# Patient Record
Sex: MALE | Race: WHITE
[De-identification: names, ages, dates, MRNs, and addresses within clinical notes are randomized per-mention and may not be internally consistent; named-entity substitution may affect disease eponyms.]

---

## 2018-08-12 ENCOUNTER — HOSPITAL ENCOUNTER (OUTPATIENT)
Dept: HOSPITAL 97 - ER | Age: 48
Setting detail: OBSERVATION
LOS: 2 days | Discharge: HOME | End: 2018-08-14
Attending: FAMILY MEDICINE | Admitting: FAMILY MEDICINE
Payer: COMMERCIAL

## 2018-08-12 VITALS — BODY MASS INDEX: 36.5 KG/M2

## 2018-08-12 DIAGNOSIS — E11.9: ICD-10-CM

## 2018-08-12 DIAGNOSIS — E78.5: ICD-10-CM

## 2018-08-12 DIAGNOSIS — F17.210: ICD-10-CM

## 2018-08-12 DIAGNOSIS — M62.82: Primary | ICD-10-CM

## 2018-08-12 DIAGNOSIS — R55: ICD-10-CM

## 2018-08-12 DIAGNOSIS — E86.0: ICD-10-CM

## 2018-08-12 DIAGNOSIS — N17.9: ICD-10-CM

## 2018-08-12 DIAGNOSIS — I10: ICD-10-CM

## 2018-08-12 LAB
ALBUMIN SERPL BCP-MCNC: 4.3 G/DL (ref 3.4–5)
ALP SERPL-CCNC: 109 U/L (ref 45–117)
ALT SERPL W P-5'-P-CCNC: 50 U/L (ref 12–78)
AST SERPL W P-5'-P-CCNC: 39 U/L (ref 15–37)
BUN BLD-MCNC: 77 MG/DL (ref 7–18)
GLUCOSE SERPLBLD-MCNC: 91 MG/DL (ref 74–106)
HCT VFR BLD CALC: 44.5 % (ref 39.6–49)
LYMPHOCYTES # SPEC AUTO: 2.3 K/UL (ref 0.7–4.9)
MCH RBC QN AUTO: 28.5 PG (ref 27–35)
MCV RBC: 83.2 FL (ref 80–100)
PMV BLD: 8.4 FL (ref 7.6–11.3)
POTASSIUM SERPL-SCNC: 3.5 MMOL/L (ref 3.5–5.1)
RBC # BLD: 5.35 M/UL (ref 4.33–5.43)

## 2018-08-12 PROCEDURE — 96361 HYDRATE IV INFUSION ADD-ON: CPT

## 2018-08-12 PROCEDURE — 70450 CT HEAD/BRAIN W/O DYE: CPT

## 2018-08-12 PROCEDURE — 80048 BASIC METABOLIC PNL TOTAL CA: CPT

## 2018-08-12 PROCEDURE — 81003 URINALYSIS AUTO W/O SCOPE: CPT

## 2018-08-12 PROCEDURE — 84439 ASSAY OF FREE THYROXINE: CPT

## 2018-08-12 PROCEDURE — 80061 LIPID PANEL: CPT

## 2018-08-12 PROCEDURE — 99285 EMERGENCY DEPT VISIT HI MDM: CPT

## 2018-08-12 PROCEDURE — 36415 COLL VENOUS BLD VENIPUNCTURE: CPT

## 2018-08-12 PROCEDURE — 93005 ELECTROCARDIOGRAM TRACING: CPT

## 2018-08-12 PROCEDURE — 84443 ASSAY THYROID STIM HORMONE: CPT

## 2018-08-12 PROCEDURE — 93306 TTE W/DOPPLER COMPLETE: CPT

## 2018-08-12 PROCEDURE — 85025 COMPLETE CBC W/AUTO DIFF WBC: CPT

## 2018-08-12 PROCEDURE — 80053 COMPREHEN METABOLIC PANEL: CPT

## 2018-08-12 PROCEDURE — 93880 EXTRACRANIAL BILAT STUDY: CPT

## 2018-08-12 PROCEDURE — 83735 ASSAY OF MAGNESIUM: CPT

## 2018-08-12 PROCEDURE — 96360 HYDRATION IV INFUSION INIT: CPT

## 2018-08-12 PROCEDURE — 76770 US EXAM ABDO BACK WALL COMP: CPT

## 2018-08-12 PROCEDURE — 82550 ASSAY OF CK (CPK): CPT

## 2018-08-12 PROCEDURE — 80076 HEPATIC FUNCTION PANEL: CPT

## 2018-08-12 PROCEDURE — 82962 GLUCOSE BLOOD TEST: CPT

## 2018-08-12 RX ADMIN — Medication SCH ML: at 21:47

## 2018-08-12 RX ADMIN — SODIUM CHLORIDE SCH MLS: 0.9 INJECTION, SOLUTION INTRAVENOUS at 21:45

## 2018-08-12 RX ADMIN — METOPROLOL TARTRATE SCH MG: 25 TABLET ORAL at 21:42

## 2018-08-12 RX ADMIN — ENOXAPARIN SODIUM SCH MG: 30 INJECTION SUBCUTANEOUS at 21:45

## 2018-08-12 RX ADMIN — PANTOPRAZOLE SODIUM SCH MG: 40 TABLET, DELAYED RELEASE ORAL at 21:41

## 2018-08-12 RX ADMIN — HUMAN INSULIN SCH: 100 INJECTION, SOLUTION SUBCUTANEOUS at 21:00

## 2018-08-12 NOTE — RAD REPORT
EXAM DESCRIPTION:  CT - Head Brain Wo Cont - 8/12/2018 5:38 pm

 

CLINICAL HISTORY:  Syncope

 

COMPARISON:  None.

 

TECHNIQUE:  Axial 5 mm thick images of the head were obtained without IV contrast.

 

All CT scans are performed using dose optimization technique as appropriate and may include automated
 exposure control or mA/KV adjustment according to patient size.

 

FINDINGS:  No intracranial hemorrhage, mass, edema or shift of mid-line structures. No acute infarcti
on changes seen. No abnormal extra-axial fluid collections. Ventricles are normal.

 

Mastoid air cells and visualized portions of the paranasal sinuses are clear.

 

No acute bony findings.

 

 

IMPRESSION:  Negative non-contrast CT head examination.

## 2018-08-12 NOTE — XMS REPORT
[facility] - Continuity of Care Document

 Created on:2018



Patient:IAIN DOWNING

Sex:Male

:1970

External Reference #:[id number]





Demographics







 Address  [address] [place] [id number]

 

 Home Phone  [phone]

 

 Preferred Language  English

 

 Marital Status  

 

 Latter day Affiliation  Unknown

 

 Race  White

 

 Ethnic Group  Not  or 









Author







 Organization  [facility]









Care Team Providers







 Name  Role  Phone

 

 DR CHARISSA TIAN  Admitting Physician  [phone]

 

 DR CHARISSA TIAN  Attending Physician  [phone]









Hospital Admission Diagnosis







 Code  Admission Diagnosis  Date

 

   PREDIABETES  







Social History







 Element  Code  Description  Smoking  Start Date  End Date



 Description      Status Code    



       System    



           

 

 Smoking Status  [id number]  Current every day  SNOMED-CT    



     smoker      







Problems

No data in the system



Medications







 SNOMED CT  Description

 

 [id number]  Drug Treatment Unknown



   







Allergies

No Allergy Data in the System



Results

No data in the system



Vital Signs

No data in the system



Plan of Care

No data in the system



Procedures

No data in the system



Encounters







 Date  Code  Diagnosis  Status

 

   (ICD10) -   PREDIABETES  Active







Immunizations

No data in the system



Functional Status

No data in the system



Hospital Discharge Instructions

No data in the system

## 2018-08-12 NOTE — RAD REPORT
EXAM DESCRIPTION:  US - Renal Ultrasound-Complete - 8/12/2018 9:03 pm

 

CLINICAL HISTORY:  Acute renal failure

 

COMPARISON:  None.

 

FINDINGS:  The right kidney measures 11.9 x 6.1 x 6.1 cm.  The left kidney measures 11.1 x 5.0 x 5.7 
cm. Renal cortical thickness and echogenicity are normal. No hydronephrosis. In the upper pole right 
kidney a 2.6 centimeter oval hypoechoic mass is present. Detail is somewhat limited by body habitus. 
Benign cyst is the most likely etiology.

 

No bladder wall thickening or mass. No intraluminal stone or mass.

 

 

IMPRESSION:  No hydronephrosis or suspicious renal mass.

 

A 2.6 cm oval hypoechoic mass upper pole right kidney is most likely a simple cyst.

## 2018-08-12 NOTE — RAD REPORT
EXAM DESCRIPTION:  ROBERTO Nguyen CP - 8/12/2018 9:03 pm

 

CLINICAL HISTORY:  Syncope

 

COMPARISON:  None.

 

TECHNIQUE:  Real-time sonographic evaluation of both carotid systems was performed. Grayscale and Dop
pler interrogation was performed with waveform tracing bilaterally.

 

FINDINGS:  Normal high resistance waveforms are noted in both external carotid arteries. The common c
arotid arteries and internal carotid arteries show normal low resistance waveforms.

 

Mild bulb plaquing changes are present. No visual evidence for significant luminal narrowing. Peak sy
stolic and end diastolic velocity values and the ICA/CCA ratios are in the non-hemodynamically signif
icant range.

 

Antegrade flow seen in both vertebral arteries.

 

Velocity values and ratios were recorded and are retained in the patient's imaging records.

 

 

IMPRESSION:  No significant atherosclerotic changes noted.

 

No evidence of a hemodynamically significant stenosis.

## 2018-08-12 NOTE — EDPHYS
Physician Documentation                                                                           

 [facility]                                                                

Name: Colten De La Vega                                                                               

Age: 47 yrs                                                                                       

Sex: Male                                                                                         

: 1970                                                                                   

MRN: [id number]                                                                                   

Arrival Date: 2018                                                                          

Time: 15:54                                                                                       

Account#: [id number]                                                                            

Bed 13                                                                                            

Private MD: Out, [facility]                                                                    

ED Physician Sudeep Douglas                                                                         

HPI:                                                                                              

                                                                                             

17:00 This 47 yrs old  Male presents to ER via Ambulatory with complaints of         pm1 

      Dizziness, Near Syncope.                                                                    

17:00 The patient presents with feeling faint. Onset: The symptoms/episode began/occurred 2   pm1 

      day(s) ago. Context: occurred at work, occurred while the patient was working, just         

      prior to the episode the patient experienced no apparent symptoms. Modifying factors:       

      The symptoms are alleviated by rest.                                                        

17:00 Associated signs and symptoms: Pertinent negatives: abdominal pain, chest pain, nausea, pm1 

      palpitations, shortness of breath, vomiting. Severity of symptoms: in the emergency         

      department the symptoms have resolved. The patient has not experienced similar symptoms     

      in the past. The patient has been recently seen by a physician: the patient's primary       

      care provider, 3 weeks ago for wellness check. Had labs done and all within normal          

      limits. 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 Patient        

      believes that he almost fainted because all he recalls is him sitting on the ground         

      with his coworkers holding him up from behind and another one handing him a bottled         

      water. Patient did not have any chest pain, shortness of breath, or any dizziness prior     

      to syncopal or near syncopal event. Patient was sent home and on Saturday and ,       

      the patient drank a total of two cases of bottled water because he felt that he might       

      have been dehydrated. Patient attempted to work again today but he did not have the         

      energy to pull the cables. He was sent to the ER by his employer for evaluation.            

17:00 Patient has no current complaints except for generalized muscle aches.                  pm1 

                                                                                                  

Historical:                                                                                       

- Allergies:                                                                                      

16:23 Motrin (Rash);                                                                          aj1 

16:23 Naproxen (Anaphylaxis);                                                                 aj1 

- Home Meds:                                                                                      

16:23 Lisinopril Oral [Active]; Metformin Oral [Active]; atorvastatin 10 mg oral tab          aj1 

      [Active]; Metoprolol Tartrate Oral [Active]; HCTZ [Active];                                 

- PMHx:                                                                                           

16:23 Hypertension; pre-diabetic;                                                             aj1 

                                                                                                  

- Immunization history:: Flu vaccine is up to date.                                               

- Social history:: Smoking status: Patient uses tobacco products, smokes one pack                 

  cigarettes per day.                                                                             

- Ebola Screening: : Patient denies travel to an Ebola-affected area in the 21 days               

  before illness onset.                                                                           

                                                                                                  

                                                                                                  

ROS:                                                                                              

17:00 Constitutional: Negative for fever, chills, and weight loss, Eyes: Negative for injury, pm1 

      pain, redness, and discharge, ENT: Negative for injury, pain, and discharge, Neck:          

      Negative for injury, pain, and swelling, Cardiovascular: Negative for chest pain,           

      palpitations, and edema, Respiratory: Negative for shortness of breath, cough,              

      wheezing, and pleuritic chest pain, Abdomen/GI: Negative for abdominal pain, nausea,        

      vomiting, diarrhea, and constipation, Back: Negative for injury and pain, : Negative      

      for injury, bleeding, discharge, and swelling, MS/Extremity: Negative for injury and        

      deformity, Skin: Negative for injury, rash, and discoloration, Neuro: Negative for          

      headache, weakness, numbness, tingling, and seizure.                                        

                                                                                                  

Exam:                                                                                             

17:00 Constitutional:  This is a well developed, well nourished patient who is awake, alert,  pm1 

      and in no acute distress. Head/Face:  Normocephalic, atraumatic. Eyes:  Pupils equal        

      round and reactive to light, extra-ocular motions intact.  Lids and lashes normal.          

      Conjunctiva and sclera are non-icteric and not injected.  Cornea within normal limits.      

      Periorbital areas with no swelling, redness, or edema. ENT:  Nares patent. No nasal         

      discharge, no septal abnormalities noted.  Tympanic membranes are normal and external       

      auditory canals are clear.  Oropharynx with no redness, swelling, or masses, exudates,      

      or evidence of obstruction, uvula midline.  Mucous membranes moist. Neck:  Trachea          

      midline, no thyromegaly or masses palpated, and no cervical lymphadenopathy.  Supple,       

      full range of motion without nuchal rigidity, or vertebral point tenderness.  No            

      Meningismus. Chest/axilla:  Normal chest wall appearance and motion.  Nontender with no     

      deformity.  No lesions are appreciated. Cardiovascular:  Regular rate and rhythm with a     

      normal S1 and S2.  No gallops, murmurs, or rubs.  Normal PMI, no JVD.  No pulse             

      deficits. Respiratory:  Lungs have equal breath sounds bilaterally, clear to                

      auscultation and percussion.  No rales, rhonchi or wheezes noted.  No increased work of     

      breathing, no retractions or nasal flaring. Abdomen/GI:  Soft, non-tender, with normal      

      bowel sounds.  No distension or tympany.  No guarding or rebound.  No evidence of           

      tenderness throughout. Back:  No spinal tenderness.  No costovertebral tenderness.          

      Full range of motion. Skin:  Warm, dry with normal turgor.  Normal color with no            

      rashes, no lesions, and no evidence of cellulitis. MS/ Extremity:  Pulses equal, no         

      cyanosis.  Neurovascular intact.  Full, normal range of motion.                             

17:00 Neuro: Orientation: is normal, Cranial nerves: CN II- XII are normal as tested,             

      Cerebellar function: normal finger to nose testing, Motor: is normal, moves all fours.      

                                                                                                  

Vital Signs:                                                                                      

16:23  / 64; Pulse 61; Resp 18; Temp 97.4(TE); Pulse Ox 97% on R/A; Weight 128.82 kg    aj1 

      (R); Height 6 ft. 0 in. (182.88 cm) (R); Pain 0/10;                                         

17:16  / 64; Pulse 54; Resp 19; Pulse Ox 99% on R/A;                                    rb1 

18:15  / 58; Pulse 52; Resp 17; Pulse Ox 100% on R/A;                                   rb1 

19:11  / 72; Pulse 60; Resp 18; Pulse Ox 100% on R/A; Pain 0/10;                        lp1 

16:23 Body Mass Index 38.52 (128.82 kg, 182.88 cm)                                            [facility] 

                                                                                                  

MDM:                                                                                              

16:34 Patient medically screened.                                                             pm1 

18:12 Data reviewed: vital signs. Data interpreted: Pulse oximetry: on room air is 99 %.      pm1 

      Interpretation: normal. Counseling: I had a detailed discussion with the patient and/or     

      guardian regarding: the historical points, exam findings, and any diagnostic results        

      supporting the discharge/admit diagnosis, lab results, radiology results, the need for      

      further work-up and treatment in the hospital.                                              

18:12 Physician consultation: Jimi Wisdom DO was contacted at 18:12, regarding admission,   pm1 

      and will see patient in ED.                                                                 

                                                                                                  

                                                                                             

16:57 Order name: Basic Metabolic Panel                                                       pm1 

                                                                                             

16:57 Order name: CBC with Diff                                                               pm1 

                                                                                             

16:57 Order name: CPK; Complete Time: 17:50                                                   pm1 

                                                                                             

16:57 Order name: LFT's; Complete Time: 17:50                                                 pm1 

                                                                                             

16:58 Order name: Basic Metabolic Panel; Complete Time: 17:50                                 EDMS

                                                                                             

16:58 Order name: CBC with Automated Diff; Complete Time: 17:50                               EDMS

                                                                                             

16:57 Order name: EKG; Complete Time: 16:58                                                   pm1 

                                                                                             

16:57 Order name: EKG - Nurse/Tech; Complete Time: 18:57                                      pm1 

                                                                                             

16:57 Order name: IV Saline Lock; Complete Time: 17:46                                        pm1 

                                                                                             

16:58 Order name: CT Head Brain wo Cont; Complete Time: 17:50                                 pm1 

                                                                                             

19:17 Order name: Urine Dipstick--Ancillary (enter results)                                     

                                                                                             

19:21 Order name: Urine Dipstick-Ancillary; Complete Time: 20:25                              EDMS

                                                                                             

16:57 Order name: Labs collected and sent; Complete Time: 17:46                               pm1 

                                                                                             

16:57 Order name: Urine Dipstick-Ancillary (obtain specimen); Complete Time: 19:12            pm1 

                                                                                                  

Administered Medications:                                                                         

17:35 Drug: NS 0.9% 1000 ml Route: IV; Rate: 1000 ml; Site: right antecubital;                rb1 

18:40 Follow up: IV Status: Completed infusion                                                rb1 

18:35 Drug: NS 0.9% 1000 ml Route: IV; Rate: 1000 ml; Site: right antecubital;                rb1 

20:19 Follow up: IV Status: Completed infusion; IV Intake: 1000ml                             lp1 

                                                                                                  

                                                                                                  

Disposition:                                                                                      

18 18:13 Hospitalization ordered by Jimi Wisdom for Observation. Preliminary             

  diagnosis are Acute kidney injury, Dehydration, Rhabdomyolysis.                                 

- Bed requested for Telemetry/MedSurg (observation).                                              

- Status is Observation.                                                                      lp1 

- Condition is Stable.                                                                            

- Problem is new.                                                                                 

- Symptoms have improved.                                                                         

UTI on Admission? No                                                                              

                                                                                                  

                                                                                                  

                                                                                                  

Addendum:                                                                                         

2018                                                                                        

     08:26 Co-signature as Attending Physician, Sudeep Douglas MD.                                    r
n

                                                                                                  

Signatures:                                                                                       

Dispatcher [employer]                           Pilar Funes RN                     RN   [email or web address]                                                  

Komal Francisco RN                     RN   kl                                                   

Sudeep Douglas MD MD rn Pena, Laura, RN RN   lp1                                                  

Lacey Morales, RN                     RN   rb1                                                  

Shadi Quintero, BIANCA                    NP   pm1                                                  

                                                                                                  

Corrections: (The following items were deleted from the chart)                                    

                                                                                             

19:49 18:13 Hospitalization Ordered by Jimi Wisdom DO for Observation. Preliminary          kl  

      diagnosis is Acute kidney injury; Dehydration; Rhabdomyolysis. Bed requested for            

      Telemetry/MedSurg (observation). Status is Observation. Condition is Stable. Problem is     

      new. Symptoms have improved. UTI on Admission? No. pm1                                      

21:02 19:49 2018 18:13 Hospitalization Ordered by Jimi Wisdom DO for Observation.     lp1 

      Preliminary diagnosis is Acute kidney injury; Dehydration; Rhabdomyolysis. Bed              

      requested for Telemetry/MedSurg (observation). Status is Observation. Condition is          

      Stable. Problem is new. Symptoms have improved. UTI on Admission? No. kl                    

                                                                                                  

**************************************************************************************************

## 2018-08-12 NOTE — P.HP
Certification for Inpatient


Patient admitted to: Observation


With expected LOS: <2 Midnights


Patient will require the following post-hospital care: None


Practitioner: I am a practitioner with admitting privileges, knowledge of 

patient current condition, hospital course, and medical plan of care.


Services: Services provided to patient in accordance with Admission 

requirements found in Title 42 Section 412.3 of the Code of Federal Regulations





Patient History


Date of Service: 08/12/18


Primary Care Provider: ULICES Maza


Reason for admission: Fatigue, presyncope


History of Present Illness: 





47-year-old  male presented emergency room with fatigue and presyncope.





Patient reports a history of diabetes, hypertension, hyperlipidemia and tobacco 

abuse.  Patient takes multiple medications including metformin, lisinopril, 

hydrochlorothiazide, Lipitor and metoprolol.  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 

originally from [place], [place].  He reports fatigue.  On Friday he was lifting 

heavy equipment on the boat that he works on.  He had been working very hard.  

He felt like he was going to faint.  He apparently sat down.  He does not 

recall the couple of seconds while sitting down.  His coworkers were asking if 

he was okay.  He recalls telling his coworkers that he was okay.  He did not 

fall or hurt himself.  His coworkers ask if he wanted to get off the boat to 

get evaluated.  He did not want to go.  He decided to stay.  He decided to 

rest.  He rested for about 6 hr.  He drank plan of fluids.  The following day 

he worked.  He did feel little bit tired.  He reports hydrating with Gatorade.  

Today he was at work felt more lightheaded and fatigue.  Some muscle cramping 

was noted. He was doing a job that usually takes him about 10 min but it was 

taking him more time and required more help.  That is when he was told to stop 

and go to the emergency room for evaluation.





In the ER he was evaluated.  Initial blood pressure was slightly low with a 

systolic around 100-110.  Bicarb 19, BUN of 77, creatinine 3.5 with a GFR of 

19. Glucose 91.  AST 39.  CPK was elevated at 701. CT head was unremarkable.  

Patient was moving appropriately.  Due to the nature the findings the patient 

was admitted for observation.  He was given IV fluids in the emergency room.





When I saw the patient ER, appeared stable.  He went to go home.  He understand 

the risks but eventually decided to stay.  He reports that he last saw his PCP 

about 3 weeks ago.  He had normal lab at that time.  He recalls his A1c being 

about 6.4.





- Past Medical/Surgical History


Diabetic: Yes


-: Diabetes mellitus type 2


-: Hypertension


-: Hyperlipidemia


-: Tobacco abuse


Past Surgical History: Patient denies surgical history


Psychosocial/ Personal History: The patient is .  He has 1 child.  He 

works on a boat





- Family History


  ** Father


-: Hypertension





  ** Mother


-: Diabetes, Stroke





- Social History


Smoking Status: Heavy Tobacco smoker (>10 cigarettes/day)


Counseled patient to stop smoking for: less than 10 minutes


Smoking therapy provided: Yes


Patient receptive to therapy: No


Alcohol use: No


CD- Drugs: No


Caffeine use: Yes


Place of Residence: Home





Review of Systems


General: Weakness, Malaise, As per HPI


Eyes: Unremarkable


ENT: Unremarkable


Respiratory: As per HPI


Cardiovascular: Light Headedness, As per HPI


Gastrointestinal: Nausea, As per HPI


Genitourinary: Unremarkable


Musculoskeletal: As per HPI


Integumentary: Unremarkable


Neurological: Weakness, As per HPI


Lymphatics: Unremarkable





Physical Examination





- Physical Exam


General: Alert, In no apparent distress, Oriented x3, Cooperative


HEENT: Atraumatic, Normocephalic, PERRLA, Other (Dry mucous membranes)


Neck: Supple, No Thyromegaly


Respiratory: Clear to auscultation bilaterally, Normal air movement


Cardiovascular: Normal pulses, Regular rate/rhythm


Gastrointestinal: Normal bowel sounds, Soft and benign, Non-distended, No 

tenderness, No masses, No rebound, No guarding


Musculoskeletal: No contractures, No erythema, No tenderness, No warmth


Integumentary: No tenderness/swelling, No erythema, No warmth, No cyanosis


Neurological: Normal speech, Normal strength at 5/5 x4 extr, Normal tone, 

Normal affect


Lymphatics: No axilla or inguinal lymphadenopathy





- Studies


Laboratory Data (last 24 hrs)





08/12/18 17:15: WBC 7.2, Hgb 15.2, Hct 44.5, Plt Count 277


08/12/18 17:15: Sodium 137, Potassium 3.5, BUN 77 H, Creatinine 3.50 H, Glucose 

91, Total Bilirubin 0.5, AST 39 H, ALT 50, Alkaline Phosphatase 109








Assessment and Plan





- Problems (Diagnosis)


(1) Pre-syncope


Current Visit: Yes   Status: Acute   


Plan: 


Presyncope, fatigue likely from rhabdomyolysis and dehydration.  Acute renal 

injury noted likely compromise from ACE-inhibitor, hydrochlorothiazide and 

metformin.  Will hold all of these medications.  Will provide IV fluids.  Will 

reassess tomorrow.  Will monitor CPK.  Will check echocardiogram, carotid 

Doppler.  CT of the head unremarkable.  Will also order renal ultrasound.  Will 

adjust blood pressure medication.  Will provide sliding scale.  Anticipate 

improvement with hydration.  Likely discharge tomorrow.








(2) Dehydration


Current Visit: Yes   Status: Acute   


Plan: 


Continue with IV fluid hydration.  Will monitor closely.








(3) Rhabdomyolysis


Current Visit: Yes   Status: Acute   


Plan: 


Likely from dehydration.  Will continue with IV fluids.  Will reassess tomorrow.


Qualifiers: 


   Rhabdomyolysis type: non-traumatic   Qualified Code(s): M62.82 - 

Rhabdomyolysis   





(4) Acute renal injury


Current Visit: Yes   Status: Acute   


Plan: 


Likely from rhabdomyolysis and dehydration.  Continue as above.  Will check 

renal ultrasound.  Will hold ACE-inhibitor, diuretic therapy and metformin.








(5) Hypertension


Current Visit: Yes   Status: Chronic   


Plan: 


We will hold hydrochlorothiazide, lisinopril.  Will decrease metoprolol.  Will 

monitor and adjust appropriately.


Qualifiers: 


   Hypertension type: essential hypertension   Qualified Code(s): I10 - 

Essential (primary) hypertension   





(6) Diabetes mellitus


Current Visit: Yes   Status: Chronic   


Plan: 


Patient reports recent A1c of 6.4.  Will provide sliding scale.  Will hold 

metformin due to rhabdomyolysis and acute renal injury.


Qualifiers: 


   Diabetes mellitus type: type 2   Diabetes mellitus long term insulin use: 

without long term use   Diabetes mellitus complication status: with other 

specified complication   Qualified Code(s): E11.69 - Type 2 diabetes mellitus 

with other specified complication   





(7) Hyperlipidemia


Current Visit: Yes   Status: Chronic   


Plan: 


Will hold Lipitor at this time.  Will check fasting lipid panel.


Qualifiers: 


   Hyperlipidemia type: unspecified   Qualified Code(s): E78.5 - Hyperlipidemia

, unspecified   





(8) Tobacco abuse


Current Visit: Yes   Status: Chronic   


Plan: 


Will provide tobacco cessation.  Patient may require nicotine patch.





Discharge Plan: Home


Plan to discharge in: 24 Hours





- Advance Directives


Does patient have a Living Will: No


Does patient have a Durable POA for Healthcare: No





- Code Status/Comfort Care


Code Status Assessed: Yes


Time Spent Managing Pts Care (In Minutes): 55

## 2018-08-12 NOTE — ER
Nurse's Notes                                                                                     

 [facility]                                                                

Name: Colten De La Vega                                                                               

Age: 47 yrs                                                                                       

Sex: Male                                                                                         

: 1970                                                                                   

MRN: [id number]                                                                                   

Arrival Date: 2018                                                                          

Time: 15:54                                                                                       

Account#: [id number]                                                                            

Bed 13                                                                                            

Private MD: Out, [facility]                                                                    

Diagnosis: Acute kidney injury;Dehydration;Rhabdomyolysis                                         

                                                                                                  

Presentation:                                                                                     

                                                                                             

16:18 Presenting complaint: Patient states: He has a syncopal episode on Friday. He was at    [facility] 

      work and then next thing he knew he was sitting on the ground with a co-worker helping      

      him up and has been feeling light headed since then. Denies CP, SOB, palpitations.          

      Transition of care: patient was not received from another setting of care. Onset of         

      symptoms was August 10, 2018. Risk Assessment: Do you want to hurt yourself or someone      

      else? Patient reports no desire to harm self or others. Initial Sepsis Screen: Does the     

      patient meet any 2 criteria? No. Patient's initial sepsis screen is negative. Does the      

      patient have a suspected source of infection? No. Patient's initial sepsis screen is        

      negative. Care prior to arrival: None.                                                      

16:18 Method Of Arrival: Ambulatory                                                           [facility] 

16:18 Acuity: SHIRA 3                                                                           aj1 

                                                                                                  

Triage Assessment:                                                                                

16:23 General: Appears in no apparent distress. comfortable, Behavior is calm, cooperative,   aj1 

      appropriate for age. Pain: Denies pain. Neuro: Level of Consciousness is awake, alert,      

      obeys commands. Cardiovascular: Denies chest pain, palpitations, shortness of breath,       

      Patient's skin is warm and dry. Respiratory: Airway is patent Respiratory effort is         

      even, unlabored, Respiratory pattern is regular, symmetrical.                               

                                                                                                  

Historical:                                                                                       

- Allergies:                                                                                      

16:23 Motrin (Rash);                                                                          aj1 

16:23 Naproxen (Anaphylaxis);                                                                 aj1 

- Home Meds:                                                                                      

16:23 Lisinopril Oral [Active]; Metformin Oral [Active]; atorvastatin 10 mg oral tab          aj1 

      [Active]; Metoprolol Tartrate Oral [Active]; HCTZ [Active];                                 

- PMHx:                                                                                           

16:23 Hypertension; pre-diabetic;                                                             aj1 

                                                                                                  

- Immunization history:: Flu vaccine is up to date.                                               

- Social history:: Smoking status: Patient uses tobacco products, smokes one pack                 

  cigarettes per day.                                                                             

- Ebola Screening: : Patient denies travel to an Ebola-affected area in the 21 days               

  before illness onset.                                                                           

                                                                                                  

                                                                                                  

Screenin:30 Abuse screen: Denies threats or abuse. Nutritional screening: No deficits noted.        rb1 

      Tuberculosis screening: No symptoms or risk factors identified. Fall Risk None              

      identified.                                                                                 

                                                                                                  

Assessment:                                                                                       

16:30 General: Appears in no apparent distress. comfortable, obese, Behavior is calm,         rb1 

      cooperative, Denies fever. Pain: Complains of pain in base of the skull Pain currently      

      is 2 out of 10 on a pain scale. Neuro: Level of Consciousness is awake, alert, obeys        

      commands, Oriented to person, place, time, situation, Reports headache base of skull.       

      Cardiovascular: Capillary refill < 3 seconds is brisk in bilateral fingers.                 

      Respiratory: Airway is patent Respiratory effort is even, unlabored, Respiratory            

      pattern is regular, symmetrical. GI: Reports nausea. : No signs and/or symptoms were      

      reported regarding the genitourinary system. Derm: Skin is pink, warm \T\ dry.              

      Musculoskeletal: Range of motion: intact in all extremities.                                

17:10 Reassessment: Patient appears in no apparent distress at this time. No changes from     rb1 

      previously documented assessment.                                                           

18:20 Reassessment: Patient appears in no apparent distress at this time. Patient and/or      rb1 

      family updated on plan of care and expected duration. Pain level reassessed. Patient is     

      alert, oriented x 3, equal unlabored respirations, skin warm/dry/pink. Dr. Wisdom is at     

      pt. bedside.                                                                                

19:11 Reassessment: Patient is alert, oriented x 3, equal unlabored respirations, skin        lp1 

      warm/dry/pink. Patient aware of pending admission Patient states feeling better.            

20:25 Reassessment: Ultrasound at bedside.                                                    lp1 

                                                                                                  

Vital Signs:                                                                                      

16:23  / 64; Pulse 61; Resp 18; Temp 97.4(TE); Pulse Ox 97% on R/A; Weight 128.82 kg    aj1 

      (R); Height 6 ft. 0 in. (182.88 cm) (R); Pain 0/10;                                         

17:16  / 64; Pulse 54; Resp 19; Pulse Ox 99% on R/A;                                    rb1 

18:15  / 58; Pulse 52; Resp 17; Pulse Ox 100% on R/A;                                   rb1 

19:11  / 72; Pulse 60; Resp 18; Pulse Ox 100% on R/A; Pain 0/10;                        lp1 

16:23 Body Mass Index 38.52 (128.82 kg, 182.88 cm)                                            aj1 

                                                                                                  

ED Course:                                                                                        

15:54 Patient arrived in ED.                                                                  sb2 

15:54 Out, of Town is Private Physician.                                                      sb2 

16:21 Triage completed.                                                                       aj1 

16:23 Arm band placed on Patient placed in an exam room.                                      aj1 

16:26 Shdai Quintero NP is PHCP.                                                           pm1 

16:26 Sudeep Douglas MD is Attending Physician.                                                pm1 

16:30 Patient has correct armband on for positive identification. Bed in low position. Call   rb1 

      light in reach. Side rails up X 1. Pulse ox on. NIBP on.                                    

16:41 Lacey Morales, RN is Primary Nurse.                                                   rb1 

17:35 Initial lab(s) drawn, by me, sent to lab. Inserted saline lock: 20 gauge in right       em1 

      antecubital area, using aseptic technique. Blood collected.                                 

17:38 CT Head Brain wo Cont In Process Unspecified.                                           EDMS

17:38 CT completed. Patient tolerated procedure well. Patient moved back from CT.             bq  

18:12 Jimi Wisdom DO is Hospitalizing Provider.                                           pm1 

19:00 Report given to CHERI Ayala.                                                              rb1 

19:11 Urine collected: clean catch specimen, clear.                                           lp1 

19:12 No provider procedures requiring assistance completed. Patient admitted, IV remains in  lp1 

      place.                                                                                      

                                                                                                  

Administered Medications:                                                                         

17:35 Drug: NS 0.9% 1000 ml Route: IV; Rate: 1000 ml; Site: right antecubital;                rb1 

18:40 Follow up: IV Status: Completed infusion                                                rb1 

18:35 Drug: NS 0.9% 1000 ml Route: IV; Rate: 1000 ml; Site: right antecubital;                rb1 

20:19 Follow up: IV Status: Completed infusion; IV Intake: 1000ml                             lp1 

                                                                                                  

                                                                                                  

Intake:                                                                                           

20:19 IV: 1000ml; Total: 1000ml.                                                              lp1 

                                                                                                  

Outcome:                                                                                          

18:13 Decision to Hospitalize by Provider.                                                    pm1 

19:12 Condition: stable                                                                       lp1 

19:12 Instructed on the need for admit.                                                           

20:23 Admitted to Tele accompanied by nurse, via wheelchair, room 419, with chart, Report     lp1 

      called to  Ciarra Montiel LVN                                                              

21:02 Patient left the ED.                                                                    lp1 

                                                                                                  

Signatures:                                                                                       

Dispatcher [employer]                           Pilar Funes RN                     RN   aj1                                                  

Joleen Dumont Eric                               em1                                                  

Lucy James RN                         RN   lp1                                                  

Lacey Morales RN                     RN   rb1                                                  

Shadi Quintero, NP                    NP   pm1                                                  

Vilma Chang                               sb2                                                  

                                                                                                  

Corrections: (The following items were deleted from the chart)                                    

20:44 20:43 Reassessment: Ultrasound at bedside lp1                                           lp1 

                                                                                                  

**************************************************************************************************

## 2018-08-13 LAB
ALBUMIN SERPL BCP-MCNC: 3.6 G/DL (ref 3.4–5)
ALP SERPL-CCNC: 91 U/L (ref 45–117)
ALT SERPL W P-5'-P-CCNC: 42 U/L (ref 12–78)
AST SERPL W P-5'-P-CCNC: 34 U/L (ref 15–37)
BUN BLD-MCNC: 53 MG/DL (ref 7–18)
BUN BLD-MCNC: 68 MG/DL (ref 7–18)
GLUCOSE SERPLBLD-MCNC: 127 MG/DL (ref 74–106)
GLUCOSE SERPLBLD-MCNC: 128 MG/DL (ref 74–106)
HCT VFR BLD CALC: 39.1 % (ref 39.6–49)
HDLC SERPL-MCNC: 26 MG/DL (ref 40–60)
LDLC SERPL CALC-MCNC: (no result) MG/DL (ref ?–130)
LYMPHOCYTES # SPEC AUTO: 2 K/UL (ref 0.7–4.9)
MAGNESIUM SERPL-MCNC: 2.4 MG/DL (ref 1.8–2.4)
MCH RBC QN AUTO: 28.6 PG (ref 27–35)
MCV RBC: 84 FL (ref 80–100)
PMV BLD: 8.5 FL (ref 7.6–11.3)
POTASSIUM SERPL-SCNC: 3.6 MMOL/L (ref 3.5–5.1)
POTASSIUM SERPL-SCNC: 4 MMOL/L (ref 3.5–5.1)
RBC # BLD: 4.65 M/UL (ref 4.33–5.43)
TSH SERPL DL<=0.05 MIU/L-ACNC: 1.77 UIU/ML (ref 0.36–3.74)

## 2018-08-13 RX ADMIN — METOPROLOL TARTRATE SCH MG: 25 TABLET ORAL at 17:34

## 2018-08-13 RX ADMIN — SODIUM CHLORIDE SCH MLS: 0.9 INJECTION, SOLUTION INTRAVENOUS at 03:03

## 2018-08-13 RX ADMIN — HUMAN INSULIN SCH: 100 INJECTION, SOLUTION SUBCUTANEOUS at 07:30

## 2018-08-13 RX ADMIN — SODIUM CHLORIDE SCH MLS: 0.45 INJECTION, SOLUTION INTRAVENOUS at 09:56

## 2018-08-13 RX ADMIN — HUMAN INSULIN SCH: 100 INJECTION, SOLUTION SUBCUTANEOUS at 16:26

## 2018-08-13 RX ADMIN — Medication SCH: at 09:00

## 2018-08-13 RX ADMIN — SODIUM CHLORIDE SCH: 0.45 INJECTION, SOLUTION INTRAVENOUS at 14:40

## 2018-08-13 RX ADMIN — SODIUM CHLORIDE SCH MLS: 0.45 INJECTION, SOLUTION INTRAVENOUS at 17:34

## 2018-08-13 RX ADMIN — HUMAN INSULIN SCH: 100 INJECTION, SOLUTION SUBCUTANEOUS at 11:30

## 2018-08-13 RX ADMIN — ENOXAPARIN SODIUM SCH MG: 30 INJECTION SUBCUTANEOUS at 09:56

## 2018-08-13 RX ADMIN — SODIUM CHLORIDE SCH MLS: 0.45 INJECTION, SOLUTION INTRAVENOUS at 20:16

## 2018-08-13 RX ADMIN — METOPROLOL TARTRATE SCH MG: 25 TABLET ORAL at 06:08

## 2018-08-13 RX ADMIN — PANTOPRAZOLE SODIUM SCH MG: 40 TABLET, DELAYED RELEASE ORAL at 09:55

## 2018-08-13 RX ADMIN — Medication SCH: at 20:04

## 2018-08-13 RX ADMIN — HUMAN INSULIN SCH: 100 INJECTION, SOLUTION SUBCUTANEOUS at 20:11

## 2018-08-13 NOTE — ECHO
HEIGHT: 6 ft 2 in   WEIGHT: 284 lb 9.6 oz   DATE OF STUDY: 08/13/2018   REFER DR: Jimi Wisdom DO

2-DIMENSIONAL: YES

     M.MODE: YES

 DOPPLER: YES

COLOR FLOW: YES



                    TDS:  NO

PORTABLE: NO

 DEFINITY:  NO

BUBBLE STUDY: NO





DIAGNOSIS:  PRESYNCOPE



CARDIAC HISTORY:  

CATHERIZATION: 

SURGERY: 

PROSTHETIC VALVE: 

PACEMAKER: 





MEASUREMENTS (cm)

    DIASTOLIC (NORMALS)                 SYSTOLIC (NORMALS)

IVSd                 1.1 (0.6-1.2)                    LA Diam 4.4 (1.9-4.0)     LVEF       
  54%  

LVIDd               4.9 (3.5-5.7)                        LVIDs      3.5 (2.0-3.5)     %FS  
        28%

LVPWd             1.2 (0.6-1.2)

Ao Diam           3.2 (2.0-3.7)



2 DIMENSIONAL ASSESSMENT:

RIGHT ATRIUM:                   NORMAL

LEFT ATRIUM:       DILATED



RIGHT VENTRICLE:            NORMAL

LEFT VENTRICLE: NORMAL



TRICUSPID VALVE:             NORMAL

MITRAL VALVE:     NORMAL



PULMONIC VALVE:             NORMAL

AORTIC VALVE:     NORMAL



PERICARDIAL EFFUSION: NONE

AORTIC ROOT:      NORMAL





LEFT VENTRICULAR WALL MOTION:     



DOPPLER/COLOR FLOW:     PHYSIOLOGIC TRICUPSID REGURGITATION. NORMAL RIGHT VENTRICULAR 
SYSTOLIC PRESSURE.



COMMENTS:      NORMAL LEFT VENTRICULAR EJECTION FRACTION. DILATED LEFT ATRIUM, OTHERWISE 
NORMAL 2D ECHOCARDIOGRAM. PHYSIOLOGIC TRICUPSID REGURGITATION



TECHNOLOGIST:   EDUARDO ROSAS

## 2018-08-13 NOTE — EKG
Test Date:    2018-08-12               Test Time:    17:07:58

Technician:   SRI                                    

                                                     

MEASUREMENT RESULTS:                                       

Intervals:                                           

Rate:         52                                     

SD:           172                                    

QRSD:         102                                    

QT:           428                                    

QTc:          398                                    

Axis:                                                

P:            39                                     

SD:           172                                    

QRS:          41                                     

T:            35                                     

                                                     

INTERPRETIVE STATEMENTS:                                       

                                                     

Sinus bradycardia

Otherwise normal ECG

No previous ECG available for comparison



Electronically Signed On 08-13-18 05:40:41 CDT by Sid Hill

## 2018-08-13 NOTE — P.PN
Subjective


Date of Service: 08/13/18


Primary Care Provider: ULICES Maza


Chief Complaint: Fatigue, presyncope


Subjective: Improving (Patient feels better.  Patient desires to go home.)





Physical Examination





- Vital Signs


Temperature: 97.6 F


Blood Pressure: 126/65


Pulse: 59


Respirations: 18


Pulse Ox (%): 98





- Physical Exam


General: Alert, In no apparent distress, Oriented x3, Cooperative


HEENT: Atraumatic


Neck: Supple


Respiratory: Clear to auscultation bilaterally, Normal air movement


Cardiovascular: Normal pulses, Regular rate/rhythm


Gastrointestinal: Normal bowel sounds, Soft and benign, Non-distended, No 

tenderness, No masses, No rebound, No guarding


Musculoskeletal: No erythema, No tenderness, No warmth


Integumentary: No tenderness/swelling, No erythema, No warmth, No cyanosis


Neurological: Normal speech, Normal strength at 5/5 x4 extr, Normal tone, 

Normal affect


Lymphatics: No axilla or inguinal lymphadenopathy





- Studies


Laboratory Data (last 24 hrs)





08/12/18 17:15: WBC 7.2, Hgb 15.2, Hct 44.5, Plt Count 277


08/12/18 17:15: Sodium 137, Potassium 3.5, BUN 77 H, Creatinine 3.50 H, Glucose 

91, Total Bilirubin 0.5, AST 39 H, ALT 50, Alkaline Phosphatase 109





Medications List Reviewed: Yes





Assessment & Plan





- Problems (Diagnosis)


(1) Pre-syncope


Onset Date: 08/13/18   Current Visit: Yes   Status: Acute   


Plan: 


Presyncope, fatigue likely from rhabdomyolysis and dehydration.  Acute renal 

injury noted likely compromised from ACE-inhibitor, hydrochlorothiazide and 

metformin.  Will continue to hold all of these medications.  Will continue with 

IV fluids.  Renal ultrasound shows 2.6 cm right upper lobe cysts likely benign.

  Patient feels better.  Carotid Doppler negative.  Renal function still 

compromise.  Will recheck BMP later today.  Consider discharge today or 

tomorrow.  Likely tomorrow if no significant improvement in renal function.  

Patient overall improved.





I will turn the service over to Dr. Fenton tomorrow.  I will go over the plan of 

care with her.








(2) Dehydration


Onset Date: 08/13/18   Current Visit: Yes   Status: Acute   


Plan: 


Continue with IV fluid hydration.  Will monitor closely.








(3) Rhabdomyolysis


Onset Date: 08/13/18   Current Visit: Yes   Status: Acute   


Plan: 


Likely from dehydration.  This has improved with IV fluids.  Will continue to 

monitor 


Qualifiers: 


   Rhabdomyolysis type: non-traumatic   Qualified Code(s): M62.82 - 

Rhabdomyolysis   





(4) Acute renal injury


Onset Date: 08/13/18   Current Visit: Yes   Status: Acute   


Plan: 


Likely from rhabdomyolysis and dehydration.  Continue with IV fluids.  Will 

continue to hold ACE-inhibitor, metformin and diuretic therapy.  Possible 

discharge later today if renal function improved.  But likely home tomorrow








(5) Hypertension


Onset Date: 08/13/18   Current Visit: Yes   Status: Chronic   


Plan: 


Will continue to hold hydrochlorothiazide, lisinopril.  Continue with 

metoprolol.  Blood pressure controlled.


Qualifiers: 


   Hypertension type: essential hypertension   Qualified Code(s): I10 - 

Essential (primary) hypertension   





(6) Diabetes mellitus


Onset Date: 08/13/18   Current Visit: Yes   Status: Chronic   


Plan: 


Patient reports recent A1c of 6.4.  Will provide sliding scale.  Will hold 

metformin due to rhabdomyolysis and acute renal injury.


Qualifiers: 


   Diabetes mellitus type: type 2   Diabetes mellitus long term insulin use: 

without long term use   Diabetes mellitus complication status: with other 

specified complication   Qualified Code(s): E11.69 - Type 2 diabetes mellitus 

with other specified complication   





(7) Hyperlipidemia


Onset Date: 08/13/18   Current Visit: Yes   Status: Chronic   


Plan: 


Will hold Lipitor at this time.  Patient likely requires Lovaza and fenofibrate

  instead of Lipitor at discharge.


Qualifiers: 


   Hyperlipidemia type: unspecified   Qualified Code(s): E78.5 - Hyperlipidemia

, unspecified   





(8) Tobacco abuse


Onset Date: 08/13/18   Current Visit: Yes   Status: Chronic   


Plan: 


Will provide tobacco cessation.  Patient may require nicotine patch.





Discharge Plan: Home


Plan to discharge in: 24 Hours


Time Spent Managing Pts Care (In Minutes): 55

## 2018-08-14 VITALS — SYSTOLIC BLOOD PRESSURE: 136 MMHG | DIASTOLIC BLOOD PRESSURE: 84 MMHG | TEMPERATURE: 97.4 F

## 2018-08-14 VITALS — OXYGEN SATURATION: 100 %

## 2018-08-14 LAB
ALBUMIN SERPL BCP-MCNC: 3.5 G/DL (ref 3.4–5)
ALP SERPL-CCNC: 80 U/L (ref 45–117)
ALT SERPL W P-5'-P-CCNC: 42 U/L (ref 12–78)
AST SERPL W P-5'-P-CCNC: 28 U/L (ref 15–37)
BUN BLD-MCNC: 39 MG/DL (ref 7–18)
GLUCOSE SERPLBLD-MCNC: 104 MG/DL (ref 74–106)
HCT VFR BLD CALC: 37.7 % (ref 39.6–49)
LYMPHOCYTES # SPEC AUTO: 1.8 K/UL (ref 0.7–4.9)
MAGNESIUM SERPL-MCNC: 1.7 MG/DL (ref 1.8–2.4)
MCH RBC QN AUTO: 28.6 PG (ref 27–35)
MCV RBC: 82.2 FL (ref 80–100)
PMV BLD: 8.5 FL (ref 7.6–11.3)
POTASSIUM SERPL-SCNC: 3.8 MMOL/L (ref 3.5–5.1)
RBC # BLD: 4.58 M/UL (ref 4.33–5.43)

## 2018-08-14 RX ADMIN — HUMAN INSULIN SCH: 100 INJECTION, SOLUTION SUBCUTANEOUS at 07:30

## 2018-08-14 RX ADMIN — ENOXAPARIN SODIUM SCH MG: 30 INJECTION SUBCUTANEOUS at 07:53

## 2018-08-14 RX ADMIN — PANTOPRAZOLE SODIUM SCH MG: 40 TABLET, DELAYED RELEASE ORAL at 07:45

## 2018-08-14 RX ADMIN — SODIUM CHLORIDE SCH: 0.45 INJECTION, SOLUTION INTRAVENOUS at 10:40

## 2018-08-14 RX ADMIN — SODIUM CHLORIDE SCH MLS: 0.45 INJECTION, SOLUTION INTRAVENOUS at 03:29

## 2018-08-14 RX ADMIN — METOPROLOL TARTRATE SCH MG: 25 TABLET ORAL at 05:09

## 2018-08-14 RX ADMIN — Medication SCH: at 07:53

## 2018-08-14 RX ADMIN — HUMAN INSULIN SCH: 100 INJECTION, SOLUTION SUBCUTANEOUS at 11:30
